# Patient Record
Sex: MALE | Race: WHITE
[De-identification: names, ages, dates, MRNs, and addresses within clinical notes are randomized per-mention and may not be internally consistent; named-entity substitution may affect disease eponyms.]

---

## 2022-12-07 ENCOUNTER — TELEMEDICINE (OUTPATIENT)
Dept: INTERNAL MEDICINE CLINIC | Facility: CLINIC | Age: 52
End: 2022-12-07
Payer: MEDICAID

## 2022-12-07 DIAGNOSIS — I10 HYPERTENSION, UNSPECIFIED TYPE: ICD-10-CM

## 2022-12-07 DIAGNOSIS — G89.29 OTHER CHRONIC PAIN: ICD-10-CM

## 2022-12-07 DIAGNOSIS — J44.9 CHRONIC OBSTRUCTIVE PULMONARY DISEASE, UNSPECIFIED COPD TYPE (HCC): ICD-10-CM

## 2022-12-07 DIAGNOSIS — Z76.89 ENCOUNTER TO ESTABLISH CARE: ICD-10-CM

## 2022-12-07 DIAGNOSIS — Z95.828 H/O AORTA-ILIAC-FEMORAL BYPASS: ICD-10-CM

## 2022-12-07 DIAGNOSIS — Z79.4 TYPE 2 DIABETES MELLITUS WITH HYPERGLYCEMIA, WITH LONG-TERM CURRENT USE OF INSULIN (HCC): Primary | ICD-10-CM

## 2022-12-07 DIAGNOSIS — E11.65 TYPE 2 DIABETES MELLITUS WITH HYPERGLYCEMIA, WITH LONG-TERM CURRENT USE OF INSULIN (HCC): Primary | ICD-10-CM

## 2022-12-07 DIAGNOSIS — M79.7 FIBROMYALGIA: ICD-10-CM

## 2022-12-07 DIAGNOSIS — G62.9 NEUROPATHY: ICD-10-CM

## 2022-12-07 DIAGNOSIS — Z12.11 SCREEN FOR COLON CANCER: ICD-10-CM

## 2022-12-07 PROCEDURE — 99204 OFFICE O/P NEW MOD 45 MIN: CPT | Performed by: INTERNAL MEDICINE

## 2022-12-07 RX ORDER — INSULIN ASPART 100 [IU]/ML
40 INJECTION, SUSPENSION SUBCUTANEOUS 2 TIMES DAILY
Qty: 10 ML | Refills: 2 | Status: SHIPPED | OUTPATIENT
Start: 2022-12-07 | End: 2022-12-08

## 2022-12-07 RX ORDER — INSULIN ASPART 100 [IU]/ML
40 INJECTION, SUSPENSION SUBCUTANEOUS 2 TIMES DAILY
COMMUNITY
End: 2022-12-07 | Stop reason: SDUPTHER

## 2022-12-07 RX ORDER — ASPIRIN 325 MG
325 TABLET ORAL DAILY
COMMUNITY

## 2022-12-07 RX ORDER — CLOPIDOGREL BISULFATE 75 MG/1
75 TABLET ORAL DAILY
Qty: 90 TABLET | Refills: 1 | Status: SHIPPED | OUTPATIENT
Start: 2022-12-07

## 2022-12-07 RX ORDER — CLOPIDOGREL BISULFATE 75 MG/1
75 TABLET ORAL DAILY
COMMUNITY
Start: 2019-12-03 | End: 2022-12-07 | Stop reason: SDUPTHER

## 2022-12-07 RX ORDER — METOPROLOL SUCCINATE 25 MG/1
TABLET, EXTENDED RELEASE ORAL
Qty: 90 TABLET | Refills: 1 | Status: SHIPPED | OUTPATIENT
Start: 2022-12-07

## 2022-12-07 RX ORDER — GABAPENTIN 300 MG/1
300 CAPSULE ORAL 3 TIMES DAILY
COMMUNITY
Start: 2019-12-22

## 2022-12-07 RX ORDER — METOPROLOL SUCCINATE 25 MG/1
TABLET, EXTENDED RELEASE ORAL
COMMUNITY
Start: 2022-02-15 | End: 2022-12-07 | Stop reason: SDUPTHER

## 2022-12-07 NOTE — TELEPHONE ENCOUNTER
Pt thought he was getting the pen for his insulin, rx was sent for vial.     Please call pharmacy to advise either way.

## 2022-12-07 NOTE — PROGRESS NOTES
FOLLOW UP VISIT    Subjective:    Yasmin Fontana (: 1970) is a 46 y.o., male,   Chief Complaint   Patient presents with    Establish Care       HPI:  46year old male in to establish care. He has the following medical issues. 1. DMII- on insulin - needs labs do not have old records not sure of last A1C has had BKA as complications states foot and eye exam up to date  2. High blood pressure- on BB  3. Neuropathy- on gabapentin  4. Chronic pain has pain mgmt  5. COPD?? Not on inhalers do not have old records  6. Hx of fem bypass ? On plavix  7. HCM   Due for CPE, COLO and Vaccines        The following portions of the patient's history were reviewed and updated as appropriate:      History reviewed. No pertinent past medical history. History reviewed. No pertinent surgical history. History reviewed. No pertinent family history. Social History     Socioeconomic History    Marital status: Single     Spouse name: Not on file    Number of children: Not on file    Years of education: Not on file    Highest education level: Not on file   Occupational History    Not on file   Tobacco Use    Smoking status: Not on file    Smokeless tobacco: Not on file   Substance and Sexual Activity    Alcohol use: Not on file    Drug use: Not on file    Sexual activity: Not on file   Other Topics Concern    Not on file   Social History Narrative    Not on file     Social Determinants of Health     Financial Resource Strain: Not on file   Food Insecurity: Not on file   Transportation Needs: Not on file   Physical Activity: Not on file   Stress: Not on file   Social Connections: Not on file   Intimate Partner Violence: Not on file   Housing Stability: Not on file       Current Outpatient Medications   Medication Sig Dispense Refill    gabapentin (NEURONTIN) 300 MG capsule Take 300 mg by mouth 3 times daily.       clopidogrel (PLAVIX) 75 MG tablet Take 1 tablet by mouth daily 90 tablet 1    insulin aspart protamine-insulin aspart (NOVOLOG 70/30) (70-30) 100 UNIT/ML injection Inject 40 Units into the skin 2 times daily 10 mL 2    metoprolol succinate (TOPROL XL) 25 MG extended release tablet TAKE 1 TABLET BY MOUTH EVERY DAY 90 tablet 1    aspirin 325 MG tablet Take 325 mg by mouth daily       No current facility-administered medications for this visit. Allergies as of 12/07/2022 - Fully Reviewed 12/07/2022   Allergen Reaction Noted    Morphine Anxiety and Other (See Comments) 01/22/2012    Clindamycin Other (See Comments) 08/31/2019       Review of Systems    Objective: There were no vitals taken for this visit. Physical Exam    No results found for this visit on 12/07/22. Assessent & Plan    1. Type 2 diabetes mellitus with hyperglycemia, with long-term current use of insulin (HCC)  -     Hemoglobin A1C; Future  -     Lipid Panel; Future  -     Comprehensive Metabolic Panel; Future  -     insulin aspart protamine-insulin aspart (NOVOLOG 70/30) (70-30) 100 UNIT/ML injection; Inject 40 Units into the skin 2 times daily, Disp-10 mL, R-2Normal  2. Other chronic pain  3. Chronic obstructive pulmonary disease, unspecified COPD type ([facility] [address]  4. Neuropathy  5. Fibromyalgia  6. Encounter to establish care  7. H/O aorta-iliac-femoral bypass  Comments:  on Plavix   Orders:  -     Mik Talbot MD, Vascular Surgery, [place]  -     clopidogrel (PLAVIX) 75 MG tablet; Take 1 tablet by mouth daily, Disp-90 tablet, R-1Normal  8. Screen for colon cancer  -     AFL - Hasmukh Grimaldo MD, Gastroenterology  9.  Hypertension, unspecified type  -     metoprolol succinate (TOPROL XL) 25 MG extended release tablet; TAKE 1 TABLET BY MOUTH EVERY DAY, Disp-90 tablet, R-1Normal     Reviewed records from [facility] need to obtain old labs and old records  Check labs  Do close follow up    Carlos Alberto Denson was evaluated through a synchronous (real-time) audio-video encounter, and/or her healthcare decision maker, is aware that it is a billable service, which includes applicable co-pays, with coverage as determined by her insurance carrier. She provided verbal consent to proceed and patient identification was verified. This visit was conducted pursuant to the emergency declaration under the [facility], [address] [place] [address] authority and the "[employer] and [employer] General Act. A caregiver was present when appropriate. Ability to conduct physical exam was limited. The patient was located at home in a state where the provider was licensed to provide care. The patient and/or patient representative voiced understanding and agreement with the current diagnoses, recommendations, and possible side effects. No follow-up provider specified.       Miguel Alvarez MD

## 2022-12-14 ENCOUNTER — TELEPHONE (OUTPATIENT)
Dept: INTERNAL MEDICINE CLINIC | Facility: CLINIC | Age: 52
End: 2022-12-14

## 2022-12-14 NOTE — TELEPHONE ENCOUNTER
A referral was sent to [facility] Cardiovascular [facility]. They called the patient and was informed by the patient that he already has a doctor for these needs through [facility] and has already had his legs amputated.  He does not wish to have these services trough [facility] and will be staying with [facility]

## 2022-12-19 ENCOUNTER — TELEMEDICINE (OUTPATIENT)
Dept: INTERNAL MEDICINE CLINIC | Facility: CLINIC | Age: 52
End: 2022-12-19
Payer: MEDICAID

## 2022-12-19 DIAGNOSIS — J20.9 ACUTE BRONCHITIS, UNSPECIFIED ORGANISM: Primary | ICD-10-CM

## 2022-12-19 PROCEDURE — 99214 OFFICE O/P EST MOD 30 MIN: CPT | Performed by: INTERNAL MEDICINE

## 2022-12-19 RX ORDER — PREDNISONE 20 MG/1
TABLET ORAL
Qty: 7 TABLET | Refills: 0 | Status: SHIPPED | OUTPATIENT
Start: 2022-12-19

## 2022-12-19 RX ORDER — AZITHROMYCIN 250 MG/1
250 TABLET, FILM COATED ORAL SEE ADMIN INSTRUCTIONS
Qty: 6 TABLET | Refills: 0 | Status: SHIPPED | OUTPATIENT
Start: 2022-12-19 | End: 2022-12-24

## 2022-12-19 RX ORDER — ALBUTEROL SULFATE 90 UG/1
2 AEROSOL, METERED RESPIRATORY (INHALATION) 4 TIMES DAILY PRN
Qty: 54 G | Refills: 1 | Status: SHIPPED | OUTPATIENT
Start: 2022-12-19

## 2022-12-19 NOTE — PROGRESS NOTES
FOLLOW UP VISIT    Subjective:    Eloise House (: 1970) is a 46 y.o., male,   Chief Complaint   Patient presents with    Follow-up       HPI:  46year old with a hx of Diabetes, COPD in for congestion, wheezing, sinus pressure and cough for 5 days. He states it is getting worse. He is using his inhaler abluterol more often        The following portions of the patient's history were reviewed and updated as appropriate:      History reviewed. No pertinent past medical history. History reviewed. No pertinent surgical history. History reviewed. No pertinent family history. Social History     Socioeconomic History    Marital status: Single     Spouse name: Not on file    Number of children: Not on file    Years of education: Not on file    Highest education level: Not on file   Occupational History    Not on file   Tobacco Use    Smoking status: Not on file    Smokeless tobacco: Not on file   Substance and Sexual Activity    Alcohol use: Not on file    Drug use: Not on file    Sexual activity: Not on file   Other Topics Concern    Not on file   Social History Narrative    Not on file     Social Determinants of Health     Financial Resource Strain: Not on file   Food Insecurity: Not on file   Transportation Needs: Not on file   Physical Activity: Not on file   Stress: Not on file   Social Connections: Not on file   Intimate Partner Violence: Not on file   Housing Stability: Not on file       Current Outpatient Medications   Medication Sig Dispense Refill    insulin aspart protamine-insulin aspart (NOVOLOG 70/30) (70-30) 100 UNIT/ML injection Inject 40 Units into the skin 2 times daily (with meals) 5 Adjustable Dose Pre-filled Pen Syringe 3    aspirin 325 MG tablet Take 325 mg by mouth daily      gabapentin (NEURONTIN) 300 MG capsule Take 300 mg by mouth 3 times daily.       clopidogrel (PLAVIX) 75 MG tablet Take 1 tablet by mouth daily 90 tablet 1    metoprolol succinate (TOPROL XL) 25 MG extended release tablet TAKE 1 TABLET BY MOUTH EVERY DAY 90 tablet 1     No current facility-administered medications for this visit. Allergies as of 12/19/2022 - Fully Reviewed 12/19/2022   Allergen Reaction Noted    Morphine Anxiety and Other (See Comments) 01/22/2012    Clindamycin Other (See Comments) 08/31/2019       Review of Systems    Objective: There were no vitals taken for this visit. Physical Exam    No results found for this visit on 12/19/22. Assessent & Plan     Diagnosis Orders   1. Acute bronchitis, unspecified organism  azithromycin (ZITHROMAX) 250 MG tablet    albuterol sulfate HFA (VENTOLIN HFA) 108 (90 Base) MCG/ACT inhaler    predniSONE (DELTASONE) 20 MG tablet         Rx meds  Go to ER if worse  Start meds   Get old records      Dianna Greene was evaluated through a synchronous (real-time) audio-video encounter, and/or her healthcare decision maker, is aware that it is a billable service, which includes applicable co-pays, with coverage as determined by her insurance carrier. She provided verbal consent to proceed and patient identification was verified. This visit was conducted pursuant to the emergency declaration under the [facility], [address] [place] [address] authority and the [employer] and [employer] General Act. A caregiver was present when appropriate. Ability to conduct physical exam was limited. The patient was located at home in a state where the provider was licensed to provide care. The patient and/or patient representative voiced understanding and agreement with the current diagnoses, recommendations, and possible side effects. No follow-up provider specified.       Frederic Foster MD

## 2023-02-06 ENCOUNTER — TELEMEDICINE (OUTPATIENT)
Dept: INTERNAL MEDICINE CLINIC | Facility: CLINIC | Age: 53
End: 2023-02-06

## 2023-02-06 DIAGNOSIS — J44.9 CHRONIC OBSTRUCTIVE PULMONARY DISEASE, UNSPECIFIED COPD TYPE (HCC): ICD-10-CM

## 2023-02-06 DIAGNOSIS — J20.9 ACUTE BRONCHITIS, UNSPECIFIED ORGANISM: ICD-10-CM

## 2023-02-06 DIAGNOSIS — E13.65 OTHER SPECIFIED DIABETES MELLITUS WITH HYPERGLYCEMIA, WITHOUT LONG-TERM CURRENT USE OF INSULIN (HCC): ICD-10-CM

## 2023-02-06 DIAGNOSIS — I10 HYPERTENSION, UNSPECIFIED TYPE: ICD-10-CM

## 2023-02-06 DIAGNOSIS — Z95.828 H/O AORTA-ILIAC-FEMORAL BYPASS: ICD-10-CM

## 2023-02-06 DIAGNOSIS — K46.9 HERNIA OF ABDOMINAL CAVITY: Primary | ICD-10-CM

## 2023-02-06 RX ORDER — CUSHION
EACH MISCELLANEOUS
COMMUNITY
Start: 2022-11-21

## 2023-02-06 RX ORDER — OXYCODONE AND ACETAMINOPHEN 10; 325 MG/1; MG/1
TABLET ORAL
COMMUNITY
Start: 2023-01-21

## 2023-02-06 RX ORDER — METOPROLOL SUCCINATE 25 MG/1
TABLET, EXTENDED RELEASE ORAL
Qty: 90 TABLET | Refills: 1 | Status: SHIPPED | OUTPATIENT
Start: 2023-02-06

## 2023-02-06 RX ORDER — CLOPIDOGREL BISULFATE 75 MG/1
75 TABLET ORAL DAILY
Qty: 90 TABLET | Refills: 1 | Status: SHIPPED | OUTPATIENT
Start: 2023-02-06

## 2023-02-06 RX ORDER — ALBUTEROL SULFATE 90 UG/1
2 AEROSOL, METERED RESPIRATORY (INHALATION) 4 TIMES DAILY PRN
Qty: 54 G | Refills: 1 | Status: SHIPPED | OUTPATIENT
Start: 2023-02-06

## 2023-02-06 ASSESSMENT — PATIENT HEALTH QUESTIONNAIRE - PHQ9
SUM OF ALL RESPONSES TO PHQ QUESTIONS 1-9: 0
SUM OF ALL RESPONSES TO PHQ QUESTIONS 1-9: 0
SUM OF ALL RESPONSES TO PHQ9 QUESTIONS 1 & 2: 0
1. LITTLE INTEREST OR PLEASURE IN DOING THINGS: 0
2. FEELING DOWN, DEPRESSED OR HOPELESS: 0
SUM OF ALL RESPONSES TO PHQ QUESTIONS 1-9: 0
SUM OF ALL RESPONSES TO PHQ QUESTIONS 1-9: 0

## 2023-02-06 NOTE — PROGRESS NOTES
FOLLOW UP VISIT    Subjective:    Dc Claudio (: 1970) is a 46 y.o., male,   Chief Complaint   Patient presents with    Follow-up     Needing supplies       HPI:  46year old male in to follow up. He has the following medical issues. 1. DMII- on insulin - needs labs do not have old records not sure of last A1C has had BKA as complications states foot and eye exam up to date. Advised to get his A1C done and labs  2. High blood pressure- on BB  3. Neuropathy- on gabapentin  4. Chronic pain has pain mgmt  5. COPD?? Not on inhalers do not have old records  6. Hx of fem bypass ? On plavix  7. HCM   Due for CPE, COLO and Vaccines  8. He is c/o a hernia on his abdomen. The following portions of the patient's history were reviewed and updated as appropriate:      History reviewed. No pertinent past medical history. History reviewed. No pertinent surgical history. History reviewed. No pertinent family history.     Social History     Socioeconomic History    Marital status: Single     Spouse name: Not on file    Number of children: Not on file    Years of education: Not on file    Highest education level: Not on file   Occupational History    Not on file   Tobacco Use    Smoking status: Not on file    Smokeless tobacco: Not on file   Substance and Sexual Activity    Alcohol use: Not on file    Drug use: Not on file    Sexual activity: Not on file   Other Topics Concern    Not on file   Social History Narrative    Not on file     Social Determinants of Health     Financial Resource Strain: Not on file   Food Insecurity: Not on file   Transportation Needs: Not on file   Physical Activity: Not on file   Stress: Not on file   Social Connections: Not on file   Intimate Partner Violence: Not on file   Housing Stability: Not on file       Current Outpatient Medications   Medication Sig Dispense Refill    oxyCODONE-acetaminophen (PERCOCET)  MG per tablet TAKE 1 TABLET BY MOUTH EVERY 6 HOURS AS NEEDED FOR 30 DAYS      Misc. Devices (Dorrine Mouna) MISC as directed      albuterol sulfate HFA (VENTOLIN HFA) 108 (90 Base) MCG/ACT inhaler Inhale 2 puffs into the lungs 4 times daily as needed for Wheezing 54 g 1    insulin aspart protamine-insulin aspart (NOVOLOG 70/30) (70-30) 100 UNIT/ML injection Inject 40 Units into the skin 2 times daily (with meals) 5 Adjustable Dose Pre-filled Pen Syringe 3    gabapentin (NEURONTIN) 300 MG capsule Take 300 mg by mouth 3 times daily. clopidogrel (PLAVIX) 75 MG tablet Take 1 tablet by mouth daily 90 tablet 1    metoprolol succinate (TOPROL XL) 25 MG extended release tablet TAKE 1 TABLET BY MOUTH EVERY DAY 90 tablet 1    aspirin 325 MG tablet Take 325 mg by mouth daily (Patient not taking: Reported on 2/6/2023)       No current facility-administered medications for this visit. Allergies as of 02/06/2023 - Fully Reviewed 02/06/2023   Allergen Reaction Noted    Morphine Anxiety and Other (See Comments) 01/22/2012    Clindamycin Other (See Comments) 08/31/2019       Review of Systems    Objective: There were no vitals taken for this visit. Physical Exam    No results found for this visit on 02/06/23. Assessent & Plan    1. Type 2 diabetes mellitus with hyperglycemia, with long-term current use of insulin (HCC)  -     Hemoglobin A1C; Future  -     Lipid Panel; Future  -     Comprehensive Metabolic Panel; Future  -     insulin aspart protamine-insulin aspart (NOVOLOG 70/30) (70-30) 100 UNIT/ML injection; Inject 40 Units into the skin 2 times daily, Disp-10 mL, R-2Normal  2. Other chronic pain  3. Chronic obstructive pulmonary disease, unspecified COPD type ([facility] [address]  4. Neuropathy  5. Fibromyalgia  6. Encounter to establish care  7. H/O aorta-iliac-femoral bypass  Comments:  on Plavix   Orders:  -     Jefferson Rowe MD, Vascular Surgery, [place]  -     clopidogrel (PLAVIX) 75 MG tablet;  Take 1 tablet by mouth daily, Disp-90 tablet, R-1Normal  8. Screen for colon cancer  -     AFL - Nito Michael MD, Gastroenterology  9. Hypertension, unspecified type  -     metoprolol succinate (TOPROL XL) 25 MG extended release   10. Hernia- patient states him and his wife feel it and it goes back in - will refer to [facility] was evaluated through a synchronous (real-time) audio-video encounter, and/or her healthcare decision maker, is aware that it is a billable service, which includes applicable co-pays, with coverage as determined by her insurance carrier. She provided verbal consent to proceed and patient identification was verified. This visit was conducted pursuant to the emergency declaration under the [facility] [address] [facility] authority and the [employer] and [employer] General Act. A caregiver was present when appropriate. Ability to conduct physical exam was limited. The patient was located at home in a state where the provider was licensed to provide care. The patient and/or patient representative voiced understanding and agreement with the current diagnoses, recommendations, and possible side effects. No follow-up provider specified.       Aryan Mandel MD

## 2023-02-21 ENCOUNTER — OFFICE VISIT (OUTPATIENT)
Dept: SURGERY | Age: 53
End: 2023-02-21
Payer: MEDICAID

## 2023-02-21 VITALS — DIASTOLIC BLOOD PRESSURE: 82 MMHG | HEART RATE: 112 BPM | SYSTOLIC BLOOD PRESSURE: 147 MMHG

## 2023-02-21 DIAGNOSIS — R10.33 PERIUMBILICAL PAIN: Primary | ICD-10-CM

## 2023-02-21 PROCEDURE — 99203 OFFICE O/P NEW LOW 30 MIN: CPT | Performed by: SURGERY

## 2023-02-22 NOTE — PROGRESS NOTES
Date: 2023      Name: Emani Tomas      MRN: [id number]       : 1970       Age: 46 y.o. Sex: male        Carly Garzon MD       CC:    Chief Complaint   Patient presents with    New Patient     NP - abdominal hernia       HPI:     Emani Tomas is a 46 y.o. male who presents for evaluation of a ventral hernia as a referral from Dr. Cookie Metcalf. I know the patient from a laparoscopic cholecystectomy done on 1/25/10. The patient is on Plavix. 23: The patient missed office visits on 22 and 22. He insisted he be seen today. He has some pain around his umbilicus when he sits up. He has difficulty sitting up as he has had bilateral AKA'S. He has some nausea, but no vomiting and no constipation. He has not noted a bulge or protrusion. He was told by his wife that the pain he was describing was most likely a hernia. There was no emergency for him to be seen today as this pain has been going on for over one year. PMH:    History reviewed. No pertinent past medical history. PSH:    Past Surgical History:   Procedure Laterality Date    AORTA - BILATERAL FEMORAL ARTERY BYPASS GRAFT      CHOLECYSTECTOMY      ELBOW SURGERY  2019    Left elbow replacement    LEG AMPUTATION THROUGH KNEE Bilateral 2019    TONSILLECTOMY         MEDS:    Prior to Visit Medications    Medication Sig Taking? Authorizing Provider   oxyCODONE-acetaminophen (PERCOCET)  MG per tablet every 6 hours as needed. Yes Historical Provider, MD   Misc.  Devices ([employer]) MISC as directed Yes Historical Provider, MD   albuterol sulfate HFA (VENTOLIN HFA) 108 (90 Base) MCG/ACT inhaler Inhale 2 puffs into the lungs 4 times daily as needed for Wheezing Yes Carly Garzon MD   metoprolol succinate (TOPROL XL) 25 MG extended release tablet TAKE 1 TABLET BY MOUTH EVERY DAY Yes Carly Garzon MD   insulin aspart protamine-insulin aspart (NOVOLOG 70/30) (70-30) 100 UNIT/ML injection Inject 40 Units into the skin 2 times daily (with meals)  Patient taking differently: Inject 35 Units into the skin 2 times daily (with meals) Yes Maximino Anthony MD   clopidogrel (PLAVIX) 75 MG tablet Take 1 tablet by mouth daily Yes Maximino Anthony MD   gabapentin (NEURONTIN) 300 MG capsule Take 900 mg by mouth in the morning and at bedtime. Yes Historical Provider, MD   aspirin 325 MG tablet Take 325 mg by mouth daily  Patient not taking: Reported on 2/21/2023  Historical Provider, MD       ALLERGIES:      Allergies   Allergen Reactions    Morphine Anxiety and Other (See Comments)     \"causes me to have panic attacks\"      Clindamycin Other (See Comments)     Swelling, rash       SH:    Social History     Tobacco Use    Smoking status: Every Day     Types: Cigarettes    Smokeless tobacco: Never       FH:    History reviewed. No pertinent family history. ROS: The patient has no difficulty with chest pain or shortness of breath. No fever or chills. Comprehensive 13 point review of systems was otherwise unremarkable except as noted above. Physical Exam:     BP (!) 147/82   Pulse (!) 112     General: Alert, oriented, cooperative white male in a wheelchair. Eyes: Sclera are clear. Conjunctiva and lids within normal limits. No icterus. Ears and Nose: no gross deformities to visual inspection, gross hearing intact  Neck: Supple, trachea midline, no appreciable thyromegaly  Resp: Breathing is  non-labored. Lungs clear to auscultation without wheezing or rhonchi   CV: RRR. No murmurs, rubs or gallops appreciated. Abd: soft,  obese, tenderness around the umbilicus, active BS'S. I could not appreciate a hernia on examination. Psych:  Mood and affect appropriate. Short-term memory and understanding intact      Assessment/Plan:  Umberto Macario is a 46 y.o. male who has periumbilical pain. 1.  CT scan of abdomen/pelvis with oral and IV contrast.    2.  F/U after CT scan done.     Naveed Fenton MD    FACS   2/21/2023  7:05 PM

## 2023-03-31 ENCOUNTER — TELEPHONE (OUTPATIENT)
Dept: INTERNAL MEDICINE CLINIC | Facility: CLINIC | Age: 53
End: 2023-03-31

## 2023-03-31 NOTE — TELEPHONE ENCOUNTER
Pt's mother asked if \"all patient supplies\" have been sent to \"[employer]\" medical supply store.  Writer informed parent, unable to find any communication r/t medical supplies and/or request. Parent reports she will \"get the information\" by the time Dr. Finch Call returns from vacation on 4/10/23

## 2023-11-20 ENCOUNTER — TELEPHONE (OUTPATIENT)
Dept: INTERNAL MEDICINE CLINIC | Facility: CLINIC | Age: 53
End: 2023-11-20

## 2023-11-20 NOTE — TELEPHONE ENCOUNTER
----- Message from Aurelio Cunninghamshahzad sent at 11/20/2023  4:12 PM EST -----  Subject: Message to Provider    QUESTIONS  Information for Provider? Timothy Carlton with [facility] calling to   get orders signed for home health. Patient will need tub bench, and drop   arm bedside commode. Please call Timothy Carlton ASA if provider is unable to   provide orders for the patient @ [phone]  ---------------------------------------------------------------------------  --------------  [address]  [phone]; OK to leave message on voicemail  ---------------------------------------------------------------------------  --------------  SCRIPT ANSWERS  Relationship to Patient? Covered Entity  Covered Entity Type? Home Health Care? Representative Name?  Timothy Carlton with [facility]

## 2023-12-07 DIAGNOSIS — E13.65 OTHER SPECIFIED DIABETES MELLITUS WITH HYPERGLYCEMIA, WITHOUT LONG-TERM CURRENT USE OF INSULIN (HCC): ICD-10-CM

## 2023-12-08 RX ORDER — INSULIN ASPART 100 [IU]/ML
INJECTION, SUSPENSION SUBCUTANEOUS
Refills: 3 | OUTPATIENT
Start: 2023-12-08

## 2023-12-13 ENCOUNTER — TELEPHONE (OUTPATIENT)
Dept: PRIMARY CARE CLINIC | Facility: CLINIC | Age: 53
End: 2023-12-13

## 2023-12-13 NOTE — TELEPHONE ENCOUNTER
POC (cert period 10/32/40-43/68/49) fax received  Pt last seen by PCP 02/06/23  POC accidentally signed and faxed to [facility]  VM left requesting call back

## 2023-12-13 NOTE — TELEPHONE ENCOUNTER
Spoke with Vincent Patel, clinical orders manager, who stated, at time of call, no signed POC received and that their system has been down most of the day. Vincent Patel reported she will note Pt's chart, if signed POC received, to not accept since PCP did not initiate and Pt has not been seen since 02/23.

## 2024-01-15 DIAGNOSIS — E13.65 OTHER SPECIFIED DIABETES MELLITUS WITH HYPERGLYCEMIA, WITHOUT LONG-TERM CURRENT USE OF INSULIN (HCC): ICD-10-CM

## 2024-01-15 DIAGNOSIS — J44.9 CHRONIC OBSTRUCTIVE PULMONARY DISEASE, UNSPECIFIED COPD TYPE (HCC): ICD-10-CM

## 2024-01-15 RX ORDER — ALBUTEROL SULFATE 90 UG/1
AEROSOL, METERED RESPIRATORY (INHALATION)
Qty: 36 EACH | OUTPATIENT
Start: 2024-01-15

## 2024-01-15 RX ORDER — INSULIN ASPART 100 [IU]/ML
INJECTION, SUSPENSION SUBCUTANEOUS
Refills: 3 | OUTPATIENT
Start: 2024-01-15

## 2024-01-16 DIAGNOSIS — E13.65 OTHER SPECIFIED DIABETES MELLITUS WITH HYPERGLYCEMIA, WITHOUT LONG-TERM CURRENT USE OF INSULIN (HCC): ICD-10-CM

## 2024-01-16 DIAGNOSIS — J44.9 CHRONIC OBSTRUCTIVE PULMONARY DISEASE, UNSPECIFIED COPD TYPE (HCC): ICD-10-CM

## 2024-01-16 RX ORDER — INSULIN ASPART 100 [IU]/ML
INJECTION, SUSPENSION SUBCUTANEOUS
Refills: 3 | OUTPATIENT
Start: 2024-01-16

## 2024-01-16 RX ORDER — ALBUTEROL SULFATE 90 UG/1
AEROSOL, METERED RESPIRATORY (INHALATION)
Qty: 36 EACH | OUTPATIENT
Start: 2024-01-16

## 2024-01-17 DIAGNOSIS — E13.65 OTHER SPECIFIED DIABETES MELLITUS WITH HYPERGLYCEMIA, WITHOUT LONG-TERM CURRENT USE OF INSULIN (HCC): ICD-10-CM

## 2024-01-17 RX ORDER — INSULIN ASPART 100 [IU]/ML
INJECTION, SUSPENSION SUBCUTANEOUS
Refills: 3 | OUTPATIENT
Start: 2024-01-17

## 2024-01-18 DIAGNOSIS — E13.65 OTHER SPECIFIED DIABETES MELLITUS WITH HYPERGLYCEMIA, WITHOUT LONG-TERM CURRENT USE OF INSULIN (HCC): ICD-10-CM

## 2024-01-19 DIAGNOSIS — E13.65 OTHER SPECIFIED DIABETES MELLITUS WITH HYPERGLYCEMIA, WITHOUT LONG-TERM CURRENT USE OF INSULIN (HCC): ICD-10-CM

## 2024-01-19 RX ORDER — INSULIN ASPART 100 [IU]/ML
INJECTION, SUSPENSION SUBCUTANEOUS
Refills: 3 | OUTPATIENT
Start: 2024-01-19

## 2024-01-23 DIAGNOSIS — E13.65 OTHER SPECIFIED DIABETES MELLITUS WITH HYPERGLYCEMIA, WITHOUT LONG-TERM CURRENT USE OF INSULIN (HCC): ICD-10-CM

## 2024-01-23 RX ORDER — INSULIN ASPART 100 [IU]/ML
INJECTION, SUSPENSION SUBCUTANEOUS
Refills: 3 | OUTPATIENT
Start: 2024-01-23

## 2024-01-24 RX ORDER — INSULIN ASPART 100 [IU]/ML
INJECTION, SUSPENSION SUBCUTANEOUS
Refills: 3 | OUTPATIENT
Start: 2024-01-24

## 2024-02-10 DIAGNOSIS — E13.65 OTHER SPECIFIED DIABETES MELLITUS WITH HYPERGLYCEMIA, WITHOUT LONG-TERM CURRENT USE OF INSULIN (HCC): ICD-10-CM

## 2024-02-12 RX ORDER — INSULIN ASPART 100 [IU]/ML
INJECTION, SUSPENSION SUBCUTANEOUS
Refills: 3 | OUTPATIENT
Start: 2024-02-12

## 2024-02-15 ENCOUNTER — APPOINTMENT (OUTPATIENT)
Dept: GENERAL RADIOLOGY | Age: 54
End: 2024-02-15
Payer: MEDICAID

## 2024-02-15 ENCOUNTER — HOSPITAL ENCOUNTER (INPATIENT)
Age: 54
LOS: 2 days | Discharge: HOME | End: 2024-02-17
Attending: FAMILY MEDICINE | Admitting: INTERNAL MEDICINE
Payer: MEDICAID

## 2024-02-15 ENCOUNTER — HOSPITAL ENCOUNTER (EMERGENCY)
Age: 54
Discharge: ANOTHER HOSPITAL | End: 2024-02-15
Attending: EMERGENCY MEDICINE
Payer: MEDICAID

## 2024-02-15 VITALS
TEMPERATURE: 98.5 F | DIASTOLIC BLOOD PRESSURE: 77 MMHG | WEIGHT: 168 LBS | HEART RATE: 107 BPM | OXYGEN SATURATION: 97 % | SYSTOLIC BLOOD PRESSURE: 124 MMHG | RESPIRATION RATE: 17 BRPM

## 2024-02-15 DIAGNOSIS — Z89.612 S/P AKA (ABOVE KNEE AMPUTATION) BILATERAL (HCC): Primary | ICD-10-CM

## 2024-02-15 DIAGNOSIS — Z76.89 ENCOUNTER TO ESTABLISH CARE: ICD-10-CM

## 2024-02-15 DIAGNOSIS — Z95.828 H/O AORTA-ILIAC-FEMORAL BYPASS: ICD-10-CM

## 2024-02-15 DIAGNOSIS — J10.1 INFLUENZA A: ICD-10-CM

## 2024-02-15 DIAGNOSIS — Z89.611 S/P AKA (ABOVE KNEE AMPUTATION) BILATERAL (HCC): Primary | ICD-10-CM

## 2024-02-15 DIAGNOSIS — E13.65 OTHER SPECIFIED DIABETES MELLITUS WITH HYPERGLYCEMIA, WITHOUT LONG-TERM CURRENT USE OF INSULIN (HCC): ICD-10-CM

## 2024-02-15 DIAGNOSIS — G62.9 NEUROPATHY: ICD-10-CM

## 2024-02-15 DIAGNOSIS — I10 HYPERTENSION, UNSPECIFIED TYPE: ICD-10-CM

## 2024-02-15 DIAGNOSIS — E10.10 TYPE 1 DIABETES MELLITUS WITH KETOACIDOSIS WITHOUT COMA (HCC): Primary | ICD-10-CM

## 2024-02-15 DIAGNOSIS — E87.1 HYPONATREMIA: ICD-10-CM

## 2024-02-15 DIAGNOSIS — J44.9 CHRONIC OBSTRUCTIVE PULMONARY DISEASE, UNSPECIFIED COPD TYPE (HCC): ICD-10-CM

## 2024-02-15 PROBLEM — A41.9 SEPSIS (HCC): Status: ACTIVE | Noted: 2024-02-15

## 2024-02-15 PROBLEM — E11.10 DKA, TYPE 2, NOT AT GOAL (HCC): Status: ACTIVE | Noted: 2024-02-15

## 2024-02-15 LAB
ALBUMIN SERPL-MCNC: 3.3 G/DL (ref 3.5–5)
ALBUMIN/GLOB SERPL: 0.8 (ref 0.4–1.6)
ALP SERPL-CCNC: 163 U/L (ref 45–117)
ALT SERPL-CCNC: 15 U/L (ref 13–61)
ANION GAP SERPL CALC-SCNC: 17 MMOL/L (ref 2–11)
ANION GAP SERPL CALC-SCNC: 24 MMOL/L (ref 2–11)
ANION GAP SERPL CALC-SCNC: 9 MMOL/L (ref 2–11)
APPEARANCE UR: CLEAR
AST SERPL-CCNC: 11 U/L (ref 15–37)
BACTERIA URNS QL MICRO: 0 /HPF
BASE DEFICIT BLDV-SCNC: 3 MMOL/L
BASOPHILS # BLD: 0.1 K/UL (ref 0–0.2)
BASOPHILS NFR BLD: 0 % (ref 0–2)
BILIRUB SERPL-MCNC: 0.5 MG/DL (ref 0.2–1.1)
BILIRUB UR QL: NEGATIVE
BUN SERPL-MCNC: 15 MG/DL (ref 6–23)
BUN SERPL-MCNC: 15 MG/DL (ref 6–23)
BUN SERPL-MCNC: 16 MG/DL (ref 6–23)
CALCIUM SERPL-MCNC: 8.1 MG/DL (ref 8.3–10.4)
CALCIUM SERPL-MCNC: 8.5 MG/DL (ref 8.3–10.4)
CALCIUM SERPL-MCNC: 8.9 MG/DL (ref 8.3–10.4)
CASTS URNS QL MICRO: 0 /LPF
CHLORIDE SERPL-SCNC: 83 MMOL/L (ref 98–107)
CHLORIDE SERPL-SCNC: 94 MMOL/L (ref 103–113)
CHLORIDE SERPL-SCNC: 97 MMOL/L (ref 103–113)
CO2 SERPL-SCNC: 19 MMOL/L (ref 21–32)
CO2 SERPL-SCNC: 21 MMOL/L (ref 21–32)
CO2 SERPL-SCNC: 26 MMOL/L (ref 21–32)
COLOR UR: ABNORMAL
CREAT SERPL-MCNC: 0.8 MG/DL (ref 0.8–1.5)
CREAT SERPL-MCNC: 0.83 MG/DL (ref 0.8–1.5)
CREAT SERPL-MCNC: 0.9 MG/DL (ref 0.8–1.5)
CRYSTALS URNS QL MICRO: 0 /LPF
DIFFERENTIAL METHOD BLD: ABNORMAL
EOSINOPHIL # BLD: 0 K/UL (ref 0–0.8)
EOSINOPHIL NFR BLD: 0 % (ref 0.5–7.8)
EPI CELLS #/AREA URNS HPF: NORMAL /HPF
ERYTHROCYTE [DISTWIDTH] IN BLOOD BY AUTOMATED COUNT: 13.7 % (ref 11.9–14.6)
EST. AVERAGE GLUCOSE BLD GHB EST-MCNC: 326 MG/DL
GLOBULIN SER CALC-MCNC: 4 G/DL (ref 2.8–4.5)
GLUCOSE BLD STRIP.AUTO-MCNC: 200 MG/DL (ref 65–100)
GLUCOSE BLD STRIP.AUTO-MCNC: 234 MG/DL (ref 65–100)
GLUCOSE BLD STRIP.AUTO-MCNC: 265 MG/DL (ref 65–100)
GLUCOSE BLD STRIP.AUTO-MCNC: 304 MG/DL (ref 65–100)
GLUCOSE BLD STRIP.AUTO-MCNC: 380 MG/DL (ref 65–100)
GLUCOSE BLD STRIP.AUTO-MCNC: 399 MG/DL (ref 65–100)
GLUCOSE BLD STRIP.AUTO-MCNC: 408 MG/DL (ref 65–100)
GLUCOSE BLD STRIP.AUTO-MCNC: 453 MG/DL (ref 65–100)
GLUCOSE BLD STRIP.AUTO-MCNC: 467 MG/DL (ref 65–100)
GLUCOSE BLD STRIP.AUTO-MCNC: 510 MG/DL (ref 65–100)
GLUCOSE SERPL-MCNC: 262 MG/DL (ref 65–100)
GLUCOSE SERPL-MCNC: 409 MG/DL (ref 65–100)
GLUCOSE SERPL-MCNC: 558 MG/DL (ref 65–100)
GLUCOSE UR STRIP.AUTO-MCNC: 500 MG/DL
HBA1C MFR BLD: 13 % (ref 4.8–5.6)
HCO3 BLDV-SCNC: 23.4 MMOL/L (ref 23–28)
HCT VFR BLD AUTO: 46.3 % (ref 41.1–50.3)
HGB BLD-MCNC: 15.9 G/DL (ref 13.6–17.2)
HGB UR QL STRIP: ABNORMAL
IMM GRANULOCYTES # BLD AUTO: 0.1 K/UL (ref 0–0.5)
IMM GRANULOCYTES NFR BLD AUTO: 1 % (ref 0–5)
KETONES UR QL STRIP.AUTO: >160 MG/DL
LACTATE SERPL-SCNC: 2 MMOL/L (ref 0.4–2)
LEUKOCYTE ESTERASE UR QL STRIP.AUTO: NEGATIVE
LYMPHOCYTES # BLD: 2.1 K/UL (ref 0.5–4.6)
LYMPHOCYTES NFR BLD: 13 % (ref 13–44)
MAGNESIUM SERPL-MCNC: 2.3 MG/DL (ref 1.8–2.4)
MCH RBC QN AUTO: 29.3 PG (ref 26.1–32.9)
MCHC RBC AUTO-ENTMCNC: 34.3 G/DL (ref 31.4–35)
MCV RBC AUTO: 85.4 FL (ref 82–102)
MONOCYTES # BLD: 0.9 K/UL (ref 0.1–1.3)
MONOCYTES NFR BLD: 6 % (ref 4–12)
MUCOUS THREADS URNS QL MICRO: 0 /LPF
NEUTS SEG # BLD: 12.5 K/UL (ref 1.7–8.2)
NEUTS SEG NFR BLD: 80 % (ref 43–78)
NITRITE UR QL STRIP.AUTO: NEGATIVE
NRBC # BLD: 0 K/UL (ref 0–0.2)
OTHER OBSERVATIONS: NORMAL
PH BLDV: 7.33 (ref 7.32–7.42)
PH UR STRIP: 5.5 (ref 5–9)
PHOSPHATE SERPL-MCNC: 2.7 MG/DL (ref 2.5–4.5)
PLATELET # BLD AUTO: 289 K/UL (ref 150–450)
PMV BLD AUTO: 9.5 FL (ref 9.4–12.3)
PO2 BLDV: 25 MMHG
POTASSIUM SERPL-SCNC: 3.5 MMOL/L (ref 3.5–5.1)
POTASSIUM SERPL-SCNC: 4.2 MMOL/L (ref 3.5–5.1)
POTASSIUM SERPL-SCNC: 4.6 MMOL/L (ref 3.5–5.1)
PROCALCITONIN SERPL-MCNC: 0.23 NG/ML (ref 0–0.49)
PROT SERPL-MCNC: 7.3 G/DL (ref 6.4–8.2)
PROT UR STRIP-MCNC: 100 MG/DL
RBC # BLD AUTO: 5.42 M/UL (ref 4.23–5.6)
RBC #/AREA URNS HPF: NORMAL /HPF
SAO2 % BLDV: 41 % (ref 65–88)
SERVICE CMNT-IMP: ABNORMAL
SODIUM SERPL-SCNC: 128 MMOL/L (ref 133–143)
SODIUM SERPL-SCNC: 130 MMOL/L (ref 136–146)
SODIUM SERPL-SCNC: 132 MMOL/L (ref 136–146)
SP GR UR REFRACTOMETRY: 1.01 (ref 1–1.02)
SPECIMEN TYPE: ABNORMAL
UROBILINOGEN UR QL STRIP.AUTO: 0.2 EU/DL (ref 0.2–1)
WBC # BLD AUTO: 15.7 K/UL (ref 4.3–11.1)
WBC URNS QL MICRO: 0 /HPF

## 2024-02-15 PROCEDURE — 82962 GLUCOSE BLOOD TEST: CPT

## 2024-02-15 PROCEDURE — 71045 X-RAY EXAM CHEST 1 VIEW: CPT

## 2024-02-15 PROCEDURE — 80053 COMPREHEN METABOLIC PANEL: CPT

## 2024-02-15 PROCEDURE — 81001 URINALYSIS AUTO W/SCOPE: CPT

## 2024-02-15 PROCEDURE — 84100 ASSAY OF PHOSPHORUS: CPT

## 2024-02-15 PROCEDURE — 85025 COMPLETE CBC W/AUTO DIFF WBC: CPT

## 2024-02-15 PROCEDURE — 2580000003 HC RX 258: Performed by: INTERNAL MEDICINE

## 2024-02-15 PROCEDURE — 96375 TX/PRO/DX INJ NEW DRUG ADDON: CPT

## 2024-02-15 PROCEDURE — 83036 HEMOGLOBIN GLYCOSYLATED A1C: CPT

## 2024-02-15 PROCEDURE — 99285 EMERGENCY DEPT VISIT HI MDM: CPT

## 2024-02-15 PROCEDURE — 2000000000 HC ICU R&B

## 2024-02-15 PROCEDURE — 6360000002 HC RX W HCPCS: Performed by: INTERNAL MEDICINE

## 2024-02-15 PROCEDURE — 2580000003 HC RX 258: Performed by: PHYSICIAN ASSISTANT

## 2024-02-15 PROCEDURE — 87040 BLOOD CULTURE FOR BACTERIA: CPT

## 2024-02-15 PROCEDURE — 6370000000 HC RX 637 (ALT 250 FOR IP): Performed by: INTERNAL MEDICINE

## 2024-02-15 PROCEDURE — 83605 ASSAY OF LACTIC ACID: CPT

## 2024-02-15 PROCEDURE — 6360000002 HC RX W HCPCS: Performed by: PHYSICIAN ASSISTANT

## 2024-02-15 PROCEDURE — 80048 BASIC METABOLIC PNL TOTAL CA: CPT

## 2024-02-15 PROCEDURE — 2500000003 HC RX 250 WO HCPCS: Performed by: INTERNAL MEDICINE

## 2024-02-15 PROCEDURE — 6370000000 HC RX 637 (ALT 250 FOR IP): Performed by: PHYSICIAN ASSISTANT

## 2024-02-15 PROCEDURE — 84145 PROCALCITONIN (PCT): CPT

## 2024-02-15 PROCEDURE — 96361 HYDRATE IV INFUSION ADD-ON: CPT

## 2024-02-15 PROCEDURE — 83735 ASSAY OF MAGNESIUM: CPT

## 2024-02-15 PROCEDURE — 96374 THER/PROPH/DIAG INJ IV PUSH: CPT

## 2024-02-15 PROCEDURE — 36415 COLL VENOUS BLD VENIPUNCTURE: CPT

## 2024-02-15 PROCEDURE — 82803 BLOOD GASES ANY COMBINATION: CPT

## 2024-02-15 RX ORDER — ALBUTEROL SULFATE 2.5 MG/3ML
2.5 SOLUTION RESPIRATORY (INHALATION) EVERY 6 HOURS PRN
Status: DISCONTINUED | OUTPATIENT
Start: 2024-02-15 | End: 2024-02-17 | Stop reason: HOSPADM

## 2024-02-15 RX ORDER — OSELTAMIVIR PHOSPHATE 75 MG/1
75 CAPSULE ORAL 2 TIMES DAILY
Status: DISCONTINUED | OUTPATIENT
Start: 2024-02-15 | End: 2024-02-17 | Stop reason: HOSPADM

## 2024-02-15 RX ORDER — POTASSIUM CHLORIDE 7.45 MG/ML
10 INJECTION INTRAVENOUS PRN
Status: DISCONTINUED | OUTPATIENT
Start: 2024-02-15 | End: 2024-02-17 | Stop reason: HOSPADM

## 2024-02-15 RX ORDER — DEXTROSE MONOHYDRATE, SODIUM CHLORIDE, AND POTASSIUM CHLORIDE 50; 1.49; 4.5 G/1000ML; G/1000ML; G/1000ML
INJECTION, SOLUTION INTRAVENOUS CONTINUOUS PRN
Status: DISCONTINUED | OUTPATIENT
Start: 2024-02-15 | End: 2024-02-16

## 2024-02-15 RX ORDER — ENOXAPARIN SODIUM 100 MG/ML
40 INJECTION SUBCUTANEOUS EVERY 24 HOURS
Status: DISCONTINUED | OUTPATIENT
Start: 2024-02-15 | End: 2024-02-17 | Stop reason: HOSPADM

## 2024-02-15 RX ORDER — MAGNESIUM SULFATE 1 G/100ML
1000 INJECTION INTRAVENOUS PRN
Status: DISCONTINUED | OUTPATIENT
Start: 2024-02-15 | End: 2024-02-17 | Stop reason: HOSPADM

## 2024-02-15 RX ORDER — 0.9 % SODIUM CHLORIDE 0.9 %
1000 INTRAVENOUS SOLUTION INTRAVENOUS
Status: COMPLETED | OUTPATIENT
Start: 2024-02-15 | End: 2024-02-15

## 2024-02-15 RX ORDER — GABAPENTIN 300 MG/1
900 CAPSULE ORAL 2 TIMES DAILY
Status: DISCONTINUED | OUTPATIENT
Start: 2024-02-15 | End: 2024-02-17 | Stop reason: HOSPADM

## 2024-02-15 RX ORDER — ONDANSETRON 2 MG/ML
4 INJECTION INTRAMUSCULAR; INTRAVENOUS
Status: COMPLETED | OUTPATIENT
Start: 2024-02-15 | End: 2024-02-15

## 2024-02-15 RX ORDER — METOPROLOL SUCCINATE 25 MG/1
25 TABLET, EXTENDED RELEASE ORAL DAILY
Status: DISCONTINUED | OUTPATIENT
Start: 2024-02-15 | End: 2024-02-17 | Stop reason: HOSPADM

## 2024-02-15 RX ORDER — POLYETHYLENE GLYCOL 3350 17 G/17G
17 POWDER, FOR SOLUTION ORAL DAILY PRN
Status: DISCONTINUED | OUTPATIENT
Start: 2024-02-15 | End: 2024-02-17 | Stop reason: HOSPADM

## 2024-02-15 RX ORDER — ONDANSETRON 2 MG/ML
4 INJECTION INTRAMUSCULAR; INTRAVENOUS EVERY 6 HOURS PRN
Status: DISCONTINUED | OUTPATIENT
Start: 2024-02-15 | End: 2024-02-17 | Stop reason: HOSPADM

## 2024-02-15 RX ORDER — INSULIN ASPART 100 [IU]/ML
INJECTION, SUSPENSION SUBCUTANEOUS
Refills: 3 | OUTPATIENT
Start: 2024-02-15

## 2024-02-15 RX ORDER — OXYCODONE AND ACETAMINOPHEN 10; 325 MG/1; MG/1
1 TABLET ORAL EVERY 6 HOURS PRN
Status: DISCONTINUED | OUTPATIENT
Start: 2024-02-15 | End: 2024-02-17 | Stop reason: HOSPADM

## 2024-02-15 RX ORDER — BISACODYL 10 MG
10 SUPPOSITORY, RECTAL RECTAL DAILY PRN
Status: DISCONTINUED | OUTPATIENT
Start: 2024-02-15 | End: 2024-02-17 | Stop reason: HOSPADM

## 2024-02-15 RX ORDER — SODIUM CHLORIDE 450 MG/100ML
INJECTION, SOLUTION INTRAVENOUS CONTINUOUS
Status: DISCONTINUED | OUTPATIENT
Start: 2024-02-15 | End: 2024-02-15

## 2024-02-15 RX ADMIN — SODIUM CHLORIDE 6.4 UNITS/HR: 9 INJECTION, SOLUTION INTRAVENOUS at 17:25

## 2024-02-15 RX ADMIN — OXYCODONE AND ACETAMINOPHEN 1 TABLET: 10; 325 TABLET ORAL at 18:09

## 2024-02-15 RX ADMIN — INSULIN HUMAN 10 UNITS: 100 INJECTION, SOLUTION PARENTERAL at 14:00

## 2024-02-15 RX ADMIN — SODIUM CHLORIDE 1000 ML: 9 INJECTION, SOLUTION INTRAVENOUS at 12:52

## 2024-02-15 RX ADMIN — GABAPENTIN 900 MG: 300 CAPSULE ORAL at 22:39

## 2024-02-15 RX ADMIN — OSELTAMIVIR PHOSPHATE 75 MG: 75 CAPSULE ORAL at 22:39

## 2024-02-15 RX ADMIN — METOPROLOL SUCCINATE 25 MG: 25 TABLET, EXTENDED RELEASE ORAL at 19:20

## 2024-02-15 RX ADMIN — DEXTROSE MONOHYDRATE, SODIUM CHLORIDE, AND POTASSIUM CHLORIDE: 50; 4.5; 1.49 INJECTION, SOLUTION INTRAVENOUS at 22:34

## 2024-02-15 RX ADMIN — ENOXAPARIN SODIUM 40 MG: 100 INJECTION SUBCUTANEOUS at 17:20

## 2024-02-15 RX ADMIN — ONDANSETRON 4 MG: 2 INJECTION INTRAMUSCULAR; INTRAVENOUS at 18:08

## 2024-02-15 RX ADMIN — ONDANSETRON 4 MG: 2 INJECTION INTRAMUSCULAR; INTRAVENOUS at 12:51

## 2024-02-15 RX ADMIN — SODIUM CHLORIDE: 4.5 INJECTION, SOLUTION INTRAVENOUS at 17:19

## 2024-02-15 ASSESSMENT — PAIN DESCRIPTION - LOCATION
LOCATION: GENERALIZED

## 2024-02-15 ASSESSMENT — PAIN DESCRIPTION - ORIENTATION: ORIENTATION: ANTERIOR

## 2024-02-15 ASSESSMENT — PAIN SCALES - GENERAL
PAINLEVEL_OUTOF10: 4
PAINLEVEL_OUTOF10: 0
PAINLEVEL_OUTOF10: 10
PAINLEVEL_OUTOF10: 10

## 2024-02-15 ASSESSMENT — PAIN - FUNCTIONAL ASSESSMENT: PAIN_FUNCTIONAL_ASSESSMENT: PREVENTS OR INTERFERES SOME ACTIVE ACTIVITIES AND ADLS

## 2024-02-15 ASSESSMENT — PAIN DESCRIPTION - DESCRIPTORS: DESCRIPTORS: SORE

## 2024-02-15 NOTE — ED NOTES
TRANSFER - OUT REPORT:    Verbal report given to Rahel CORRALES on Nikita Styles  being transferred to [facility] for routine progression of patient care       Report consisted of patient's Situation, Background, Assessment and   Recommendations(SBAR).     Information from the following report(s) Nurse Handoff Report, Index, ED Encounter Summary, ED SBAR, Adult Overview, MAR, and Recent Results was reviewed with the receiving nurse.    Lines:   Peripheral IV 02/15/24 Left Antecubital (Active)       Peripheral IV 02/15/24 Right Antecubital (Active)   Line Status Blood return noted;Flushed;Normal saline locked;Specimen collected 02/15/24 1343   Phlebitis Assessment No symptoms 02/15/24 1343   Infiltration Assessment 0 02/15/24 1343   Dressing Status New dressing applied;Clean, dry & intact 02/15/24 1343   Dressing Type Transparent 02/15/24 1343   Dressing Intervention New 02/15/24 1343        Opportunity for questions and clarification was provided.      Patient transported with: [employer]  Monitor

## 2024-02-15 NOTE — PROGRESS NOTES
Pt arrived to [facility]109 from [place] ED. Pt drowsy, oriented x4.    -110 on monitor, O2 sat 95% on RA.    Dual skin assessment performed with SAVANNA Mcginnis. BLE amputations, scarring noted. Sacrum red, foam dressing applied.

## 2024-02-15 NOTE — ED PROVIDER NOTES
Emergency Department Provider Note       PCP: No primary care provider on file.   Age: 53 y.o.   Sex: male     DISPOSITION Decision To Transfer 02/15/2024 01:31:58 PM       ICD-10-CM    1. Type 1 diabetes mellitus with ketoacidosis without coma (HCC)  E10.10       2. Hyponatremia  E87.1       3. Influenza A  J10.1           Medical Decision Making     Complexity of Problems Addressed:  1 or more acute illnesses that pose a threat to life or bodily function.     Data Reviewed and Analyzed:   I independently ordered and reviewed each unique test.           I interpreted the X-rays no pneumothorax.    Discussion of management or test interpretation.  53-year-old male presenting here with complaint of generalized bodyaches and positive for flu A.  He is type I diabetic and has not use his blood sugar secondary to not feeling well.  Workup here reveals patient is in diabetic ketoacidosis.  He has a glucose of 558 with an anion gap of 24 and he is hyponatremic at 128.  He had normal potassium so we bolused 10 units of insulin as well as 1 L of normal saline.  Given the patient is in DKA he will require ICU admission.  Consulted hospitalist team at [facility].  Dr. Soto is excepting physician.  We obtained a VBG here, patient's pH is currently 7.3.        The patient was admitted and I have discussed patient management with the admitting provider.  The management of this patient was discussed with an external consultant.      Risk of Complications and/or Morbidity of Patient Management:  Over the counter drug management performed.  Parental controlled substances given in the ED.  Drug therapy given requiring intensive monitoring for toxicity.  Discussion with external consultants.  Shared medical decision making was utilized in creating the patients health plan today.        Critical care procedure note : 40 minutes of critical care time was performed in the emergency department. This was separate from any other

## 2024-02-15 NOTE — ED PROVIDER NOTES
ED ATTENDING SHARED SERVICES NOTE:     I have seen and evaluated the patient and performed an independent history and physical exam, and I agree with the assessment and plan as documented in the advanced provider note.  I performed the history of present illness and medical decision making in its entirety.  With the following updates: 53-year-old male with lower extremity amputations.  Recently diagnosed with flu  Workup here today has revealed DKA with relatively mild acidosis on VBG   Chest x-ray unremarkable  Urine negative    Will transfer for admission to the hospital service    2/15/24 3:24 PM EST MD Cherie Watts Kurt A, MD  02/15/24 [id number]
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
## 2024-02-15 NOTE — PROGRESS NOTES
TRANSFER - IN REPORT:    Verbal report received from Alyssa RN (name) on Nikita Styles  being received from [facility] ED (unit) for routine progression of patient care      Report consisted of patient’s Situation, Background, Assessment and   Recommendations(SBAR).     Information from the following report(s) Nurse Handoff Report, ED Encounter Summary, and ED SBAR was reviewed with the receiving nurse.    Opportunity for questions and clarification was provided.      Assessment completed upon patient’s arrival to unit and care assume

## 2024-02-15 NOTE — ED TRIAGE NOTES
Pt here w/ lcems from home, pt has had cold and flu s/s for several days, also some n/v, bodyaches, 97.8.  154/94.  Hr 110.  Bgl 154.  Lungs clear w/ ems.  Diabetic. htn.  Copd.  Pt tested + for flu yesterday.

## 2024-02-16 DIAGNOSIS — E13.65 OTHER SPECIFIED DIABETES MELLITUS WITH HYPERGLYCEMIA, WITHOUT LONG-TERM CURRENT USE OF INSULIN (HCC): ICD-10-CM

## 2024-02-16 LAB
ANION GAP SERPL CALC-SCNC: 5 MMOL/L (ref 2–11)
ANION GAP SERPL CALC-SCNC: 5 MMOL/L (ref 2–11)
BASOPHILS # BLD: 0 K/UL (ref 0–0.2)
BASOPHILS NFR BLD: 0 % (ref 0–2)
BUN SERPL-MCNC: 14 MG/DL (ref 6–23)
BUN SERPL-MCNC: 15 MG/DL (ref 6–23)
CALCIUM SERPL-MCNC: 8.2 MG/DL (ref 8.3–10.4)
CALCIUM SERPL-MCNC: 8.5 MG/DL (ref 8.3–10.4)
CHLORIDE SERPL-SCNC: 101 MMOL/L (ref 103–113)
CHLORIDE SERPL-SCNC: 98 MMOL/L (ref 103–113)
CO2 SERPL-SCNC: 27 MMOL/L (ref 21–32)
CO2 SERPL-SCNC: 30 MMOL/L (ref 21–32)
CREAT SERPL-MCNC: 0.7 MG/DL (ref 0.8–1.5)
CREAT SERPL-MCNC: 0.8 MG/DL (ref 0.8–1.5)
DIFFERENTIAL METHOD BLD: ABNORMAL
EOSINOPHIL # BLD: 0.2 K/UL (ref 0–0.8)
EOSINOPHIL NFR BLD: 1 % (ref 0.5–7.8)
ERYTHROCYTE [DISTWIDTH] IN BLOOD BY AUTOMATED COUNT: 14.1 % (ref 11.9–14.6)
GLUCOSE BLD STRIP.AUTO-MCNC: 123 MG/DL (ref 65–100)
GLUCOSE BLD STRIP.AUTO-MCNC: 137 MG/DL (ref 65–100)
GLUCOSE BLD STRIP.AUTO-MCNC: 174 MG/DL (ref 65–100)
GLUCOSE BLD STRIP.AUTO-MCNC: 182 MG/DL (ref 65–100)
GLUCOSE BLD STRIP.AUTO-MCNC: 238 MG/DL (ref 65–100)
GLUCOSE BLD STRIP.AUTO-MCNC: 246 MG/DL (ref 65–100)
GLUCOSE BLD STRIP.AUTO-MCNC: 268 MG/DL (ref 65–100)
GLUCOSE BLD STRIP.AUTO-MCNC: 282 MG/DL (ref 65–100)
GLUCOSE SERPL-MCNC: 139 MG/DL (ref 65–100)
GLUCOSE SERPL-MCNC: 163 MG/DL (ref 65–100)
HCT VFR BLD AUTO: 40.6 % (ref 41.1–50.3)
HGB BLD-MCNC: 13.6 G/DL (ref 13.6–17.2)
IMM GRANULOCYTES # BLD AUTO: 0.1 K/UL (ref 0–0.5)
IMM GRANULOCYTES NFR BLD AUTO: 1 % (ref 0–5)
LYMPHOCYTES # BLD: 3.2 K/UL (ref 0.5–4.6)
LYMPHOCYTES NFR BLD: 24 % (ref 13–44)
MAGNESIUM SERPL-MCNC: 2.2 MG/DL (ref 1.8–2.4)
MCH RBC QN AUTO: 28.9 PG (ref 26.1–32.9)
MCHC RBC AUTO-ENTMCNC: 33.5 G/DL (ref 31.4–35)
MCV RBC AUTO: 86.4 FL (ref 82–102)
MONOCYTES # BLD: 1.1 K/UL (ref 0.1–1.3)
MONOCYTES NFR BLD: 9 % (ref 4–12)
NEUTS SEG # BLD: 8.6 K/UL (ref 1.7–8.2)
NEUTS SEG NFR BLD: 65 % (ref 43–78)
NRBC # BLD: 0 K/UL (ref 0–0.2)
PHOSPHATE SERPL-MCNC: 1.6 MG/DL (ref 2.5–4.5)
PLATELET # BLD AUTO: 265 K/UL (ref 150–450)
PMV BLD AUTO: 9.4 FL (ref 9.4–12.3)
POTASSIUM SERPL-SCNC: 3.4 MMOL/L (ref 3.5–5.1)
POTASSIUM SERPL-SCNC: 3.9 MMOL/L (ref 3.5–5.1)
RBC # BLD AUTO: 4.7 M/UL (ref 4.23–5.6)
SERVICE CMNT-IMP: ABNORMAL
SODIUM SERPL-SCNC: 133 MMOL/L (ref 136–146)
SODIUM SERPL-SCNC: 133 MMOL/L (ref 136–146)
WBC # BLD AUTO: 13.2 K/UL (ref 4.3–11.1)

## 2024-02-16 PROCEDURE — 84100 ASSAY OF PHOSPHORUS: CPT

## 2024-02-16 PROCEDURE — 36415 COLL VENOUS BLD VENIPUNCTURE: CPT

## 2024-02-16 PROCEDURE — 82962 GLUCOSE BLOOD TEST: CPT

## 2024-02-16 PROCEDURE — 85025 COMPLETE CBC W/AUTO DIFF WBC: CPT

## 2024-02-16 PROCEDURE — 6370000000 HC RX 637 (ALT 250 FOR IP): Performed by: STUDENT IN AN ORGANIZED HEALTH CARE EDUCATION/TRAINING PROGRAM

## 2024-02-16 PROCEDURE — 6370000000 HC RX 637 (ALT 250 FOR IP): Performed by: INTERNAL MEDICINE

## 2024-02-16 PROCEDURE — 6360000002 HC RX W HCPCS: Performed by: INTERNAL MEDICINE

## 2024-02-16 PROCEDURE — 2580000003 HC RX 258: Performed by: INTERNAL MEDICINE

## 2024-02-16 PROCEDURE — 80048 BASIC METABOLIC PNL TOTAL CA: CPT

## 2024-02-16 PROCEDURE — 83735 ASSAY OF MAGNESIUM: CPT

## 2024-02-16 PROCEDURE — 6370000000 HC RX 637 (ALT 250 FOR IP): Performed by: HOSPITALIST

## 2024-02-16 PROCEDURE — 2700000000 HC OXYGEN THERAPY PER DAY

## 2024-02-16 PROCEDURE — 2580000003 HC RX 258: Performed by: HOSPITALIST

## 2024-02-16 PROCEDURE — 2500000003 HC RX 250 WO HCPCS: Performed by: INTERNAL MEDICINE

## 2024-02-16 PROCEDURE — 1100000000 HC RM PRIVATE

## 2024-02-16 RX ORDER — INSULIN ASPART 100 [IU]/ML
25 INJECTION, SUSPENSION SUBCUTANEOUS 2 TIMES DAILY WITH MEALS
Status: DISCONTINUED | OUTPATIENT
Start: 2024-02-16 | End: 2024-02-16

## 2024-02-16 RX ORDER — INSULIN LISPRO 100 [IU]/ML
0-4 INJECTION, SOLUTION INTRAVENOUS; SUBCUTANEOUS
Status: DISCONTINUED | OUTPATIENT
Start: 2024-02-16 | End: 2024-02-17 | Stop reason: HOSPADM

## 2024-02-16 RX ORDER — INSULIN ASPART 100 [IU]/ML
INJECTION, SUSPENSION SUBCUTANEOUS
Refills: 3 | OUTPATIENT
Start: 2024-02-16

## 2024-02-16 RX ORDER — SODIUM CHLORIDE 9 MG/ML
INJECTION, SOLUTION INTRAVENOUS CONTINUOUS
Status: DISCONTINUED | OUTPATIENT
Start: 2024-02-16 | End: 2024-02-16 | Stop reason: ALTCHOICE

## 2024-02-16 RX ORDER — INSULIN LISPRO 100 [IU]/ML
7 INJECTION, SOLUTION INTRAVENOUS; SUBCUTANEOUS 2 TIMES DAILY WITH MEALS
Status: DISCONTINUED | OUTPATIENT
Start: 2024-02-16 | End: 2024-02-17 | Stop reason: HOSPADM

## 2024-02-16 RX ORDER — INSULIN LISPRO 100 [IU]/ML
0-4 INJECTION, SOLUTION INTRAVENOUS; SUBCUTANEOUS NIGHTLY
Status: DISCONTINUED | OUTPATIENT
Start: 2024-02-16 | End: 2024-02-17 | Stop reason: HOSPADM

## 2024-02-16 RX ORDER — INSULIN GLARGINE 100 [IU]/ML
10 INJECTION, SOLUTION SUBCUTANEOUS ONCE
Status: COMPLETED | OUTPATIENT
Start: 2024-02-16 | End: 2024-02-16

## 2024-02-16 RX ORDER — DEXTROSE MONOHYDRATE 100 MG/ML
INJECTION, SOLUTION INTRAVENOUS CONTINUOUS PRN
Status: DISCONTINUED | OUTPATIENT
Start: 2024-02-16 | End: 2024-02-17 | Stop reason: HOSPADM

## 2024-02-16 RX ADMIN — INSULIN HUMAN 17 UNITS: 100 INJECTION, SUSPENSION SUBCUTANEOUS at 09:13

## 2024-02-16 RX ADMIN — GABAPENTIN 900 MG: 300 CAPSULE ORAL at 08:55

## 2024-02-16 RX ADMIN — OXYCODONE AND ACETAMINOPHEN 1 TABLET: 10; 325 TABLET ORAL at 08:56

## 2024-02-16 RX ADMIN — SODIUM CHLORIDE: 9 INJECTION, SOLUTION INTRAVENOUS at 02:04

## 2024-02-16 RX ADMIN — OSELTAMIVIR PHOSPHATE 75 MG: 75 CAPSULE ORAL at 08:56

## 2024-02-16 RX ADMIN — INSULIN LISPRO 1 UNITS: 100 INJECTION, SOLUTION INTRAVENOUS; SUBCUTANEOUS at 09:14

## 2024-02-16 RX ADMIN — INSULIN LISPRO 2 UNITS: 100 INJECTION, SOLUTION INTRAVENOUS; SUBCUTANEOUS at 13:07

## 2024-02-16 RX ADMIN — OSELTAMIVIR PHOSPHATE 75 MG: 75 CAPSULE ORAL at 20:33

## 2024-02-16 RX ADMIN — METOPROLOL SUCCINATE 25 MG: 25 TABLET, EXTENDED RELEASE ORAL at 20:33

## 2024-02-16 RX ADMIN — OXYCODONE AND ACETAMINOPHEN 1 TABLET: 10; 325 TABLET ORAL at 20:33

## 2024-02-16 RX ADMIN — INSULIN GLARGINE 10 UNITS: 100 INJECTION, SOLUTION SUBCUTANEOUS at 02:05

## 2024-02-16 RX ADMIN — INSULIN LISPRO 7 UNITS: 100 INJECTION, SOLUTION INTRAVENOUS; SUBCUTANEOUS at 09:14

## 2024-02-16 RX ADMIN — SODIUM PHOSPHATE, MONOBASIC, MONOHYDRATE AND SODIUM PHOSPHATE, DIBASIC, ANHYDROUS 15 MMOL: 276; 142 INJECTION, SOLUTION INTRAVENOUS at 06:44

## 2024-02-16 RX ADMIN — INSULIN HUMAN 17 UNITS: 100 INJECTION, SUSPENSION SUBCUTANEOUS at 16:51

## 2024-02-16 RX ADMIN — INSULIN LISPRO 7 UNITS: 100 INJECTION, SOLUTION INTRAVENOUS; SUBCUTANEOUS at 17:34

## 2024-02-16 RX ADMIN — ENOXAPARIN SODIUM 40 MG: 100 INJECTION SUBCUTANEOUS at 17:35

## 2024-02-16 RX ADMIN — GABAPENTIN 900 MG: 300 CAPSULE ORAL at 20:33

## 2024-02-16 RX ADMIN — INSULIN LISPRO 2 UNITS: 100 INJECTION, SOLUTION INTRAVENOUS; SUBCUTANEOUS at 17:35

## 2024-02-16 ASSESSMENT — PAIN DESCRIPTION - LOCATION
LOCATION: GENERALIZED
LOCATION: GENERALIZED

## 2024-02-16 ASSESSMENT — PAIN DESCRIPTION - DESCRIPTORS
DESCRIPTORS: ACHING
DESCRIPTORS: ACHING

## 2024-02-16 ASSESSMENT — PAIN SCALES - GENERAL
PAINLEVEL_OUTOF10: 0
PAINLEVEL_OUTOF10: 7
PAINLEVEL_OUTOF10: 0
PAINLEVEL_OUTOF10: 7
PAINLEVEL_OUTOF10: 7

## 2024-02-16 ASSESSMENT — PAIN DESCRIPTION - ONSET: ONSET: GRADUAL

## 2024-02-16 ASSESSMENT — PAIN - FUNCTIONAL ASSESSMENT
PAIN_FUNCTIONAL_ASSESSMENT: ACTIVITIES ARE NOT PREVENTED
PAIN_FUNCTIONAL_ASSESSMENT: ACTIVITIES ARE NOT PREVENTED

## 2024-02-16 ASSESSMENT — PAIN DESCRIPTION - ORIENTATION: ORIENTATION: ANTERIOR

## 2024-02-16 ASSESSMENT — PAIN DESCRIPTION - FREQUENCY: FREQUENCY: INTERMITTENT

## 2024-02-16 NOTE — DIABETES MGMT
Patient admitted with DKA, type 2. Admit blood glucose 558.  HgbA1C 13 (eAG 326).  Blood glucose ranged 200-558 yesterday with patient receiving insulin drip.  Patient weaned from insulin drip with Lantus 10 units Blood glucose this morning was 238 and most recent 282. Creatinine 0.70. GFR > 60. Reviewed patient current regimen: Humalog correctional insulin, Humalog 7 units BID, and NPH 17 units BID.    Patient seen for assessment regarding diabetes management. Patient has a past medical history of neuropathy, DM, bilateral AKA. Patient states they have been living with diabetes for 10 years and voices positive family history of diabetes. Patient states they do not have a working glucometer with supplies at home. Patient states they are currently taking Novolog 70/30 insulin 35 units BID at home for management of diabetes. Patient states has been out of insulin for past 3 days.  Patient voices that they have experienced hypoglycemia in the past. Educated regarding hypoglycemia signs, symptoms, and treatment. Patient has not attended formal diabetes education in the past. Patient reports no difficulty with affording their diabetic supplies.      Patient given educational material, \"Diabetes Self-Management: A Patient Teaching Guide\", which was reviewed with patient. Explained basic physiology of diabetes, as well as causes, signs and symptoms, and treatments for hypoglycemia and hyperglycemia. Described the effects of poor glycemic control and the development of long-term complications such as renal, eye, nerve, and cardiovascular disease. Patient reports numbness and tingling in hands.  Per patient they typically drink water and Pepsi. Reviewed effects of sweetened beverages on glycemic control and discussed alternative beverages to help improve glycemic control. Discussed diet or Zero soda.  Per patient they typically eat one meal and snacks. Educated re: effects of carbohydrates on blood glucose, the \"plate  effect, and peak of insulin dose. Educated patient on the differences between prandial insulin and correctional insulin. Patient successful with practice insulin dose calculation using example blood glucose level and correctional insulin scale. Instructed patient to always seek guidance from their primary care provider about adjusting their insulin doses and not to adjust them on their own as this could negatively impact their glycemic control or result in hypoglycemia. Patient verbalizes understanding.    Patient would likely benefit from continued diabetes outpatient education. Patient provided with contact information to [facility] program to pursue at their convenience after discharge. Patient did not want referral to outpatient program.     Encouraged compliance with discharge regimen. Encouraged patient to continue to work on lifestyle modifications and to follow up with PCP for further titration of regimen. Patient does not currently have a PCP.  Case management following.  Patient verbalized understanding and voices no further questions regarding diabetes management.

## 2024-02-16 NOTE — PROGRESS NOTES
This was a visit to an influenza patient. I had prayer for the patient outside of the patient's room. I received updated information from the patient's nurse concerning this patient's needs. I will continue to follow-up with this patient and offer assistance and prayer.    Ezio Almanzar MDIV & KATHLEEN Webber

## 2024-02-16 NOTE — PROGRESS NOTES
TRANSFER - OUT REPORT:    Verbal report given to Debbi on Nikita Styles  being transferred to [facility] for routine progression of patient care       Report consisted of patient's Situation, Background, Assessment and   Recommendations(SBAR).     Information from the following report(s) Nurse Handoff Report, Adult Overview, Intake/Output, MAR, Recent Results, and Quality Measures was reviewed with the receiving nurse.           Lines:   Peripheral IV 02/15/24 Left Antecubital (Active)   Site Assessment Clean, dry & intact 02/16/24 1430   Line Status Flushed;Capped 02/16/24 1430   Line Care Connections checked and tightened 02/16/24 1430   Phlebitis Assessment No symptoms 02/16/24 1430   Infiltration Assessment 0 02/16/24 1430   Alcohol Cap Used Yes 02/16/24 1430   Dressing Status Clean, dry & intact 02/16/24 1430   Dressing Type Transparent 02/16/24 1430        Opportunity for questions and clarification was provided.      Patient transported with:  Monitor and Registered Nurse         Is This A New Presentation, Or A Follow-Up?: Skin Lesions How Severe Is Your Skin Lesion?: mild Have Your Skin Lesions Been Treated?: not been treated

## 2024-02-16 NOTE — PROGRESS NOTES
TRANSFER - IN REPORT:    Verbal report received from Malinda CORRALES on Nikita Styles  being received from ICU for routine progression of patient care      Report consisted of patient's Situation, Background, Assessment and   Recommendations(SBAR).     Information from the following report(s) Nurse Handoff Report was reviewed with the receiving nurse.    Opportunity for questions and clarification was provided.      Assessment completed upon patient's arrival to unit and care assumed.
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
## 2024-02-16 NOTE — ACP (ADVANCE CARE PLANNING)
Advance Care Planning     Advance Care Planning Activator (Inpatient)  Conversation Note      Date of ACP Conversation: 2/16/2024         ACP Activator: Pau Martin RN    {When Decision Maker makes decisions on behalf of the incapacitated patient: Decision Maker is asked to consider and make decisions based on patient values, known preferences, or best interests.     Health Care Decision Maker: No LW/HCPOA on file. Spouse is legal NOK unless document presented or completed stating otherwise.     Current Designated Health Care Decision Maker:     Primary Decision Maker: Gisele Styles - Spouse - [phone]  Click here to complete Healthcare Decision Makers including section of the Healthcare Decision Maker Relationship (ie \"Primary\")  Today we documented Decision Maker(s) consistent with Legal Next of Kin hierarchy.    Care Preferences    Full code per MD orders.

## 2024-02-17 VITALS
TEMPERATURE: 98.1 F | DIASTOLIC BLOOD PRESSURE: 98 MMHG | OXYGEN SATURATION: 95 % | RESPIRATION RATE: 16 BRPM | HEART RATE: 89 BPM | SYSTOLIC BLOOD PRESSURE: 136 MMHG | WEIGHT: 163.58 LBS | BODY MASS INDEX: 23.42 KG/M2 | HEIGHT: 70 IN

## 2024-02-17 PROBLEM — A41.9 SEPSIS (HCC): Status: RESOLVED | Noted: 2024-02-15 | Resolved: 2024-02-17

## 2024-02-17 LAB
ANION GAP SERPL CALC-SCNC: 6 MMOL/L (ref 2–11)
BASOPHILS # BLD: 0 K/UL (ref 0–0.2)
BASOPHILS NFR BLD: 0 % (ref 0–2)
BUN SERPL-MCNC: 19 MG/DL (ref 6–23)
CALCIUM SERPL-MCNC: 8.4 MG/DL (ref 8.3–10.4)
CHLORIDE SERPL-SCNC: 100 MMOL/L (ref 103–113)
CO2 SERPL-SCNC: 28 MMOL/L (ref 21–32)
CREAT SERPL-MCNC: 0.6 MG/DL (ref 0.8–1.5)
DIFFERENTIAL METHOD BLD: ABNORMAL
EOSINOPHIL # BLD: 0.5 K/UL (ref 0–0.8)
EOSINOPHIL NFR BLD: 5 % (ref 0.5–7.8)
ERYTHROCYTE [DISTWIDTH] IN BLOOD BY AUTOMATED COUNT: 14.2 % (ref 11.9–14.6)
GLUCOSE BLD STRIP.AUTO-MCNC: 223 MG/DL (ref 65–100)
GLUCOSE BLD STRIP.AUTO-MCNC: 258 MG/DL (ref 65–100)
GLUCOSE SERPL-MCNC: 245 MG/DL (ref 65–100)
HCT VFR BLD AUTO: 39 % (ref 41.1–50.3)
HGB BLD-MCNC: 12.7 G/DL (ref 13.6–17.2)
IMM GRANULOCYTES # BLD AUTO: 0.1 K/UL (ref 0–0.5)
IMM GRANULOCYTES NFR BLD AUTO: 1 % (ref 0–5)
LYMPHOCYTES # BLD: 3.7 K/UL (ref 0.5–4.6)
LYMPHOCYTES NFR BLD: 38 % (ref 13–44)
MAGNESIUM SERPL-MCNC: 2.1 MG/DL (ref 1.8–2.4)
MCH RBC QN AUTO: 28.9 PG (ref 26.1–32.9)
MCHC RBC AUTO-ENTMCNC: 32.6 G/DL (ref 31.4–35)
MCV RBC AUTO: 88.8 FL (ref 82–102)
MONOCYTES # BLD: 0.6 K/UL (ref 0.1–1.3)
MONOCYTES NFR BLD: 6 % (ref 4–12)
NEUTS SEG # BLD: 4.9 K/UL (ref 1.7–8.2)
NEUTS SEG NFR BLD: 50 % (ref 43–78)
NRBC # BLD: 0 K/UL (ref 0–0.2)
PHOSPHATE SERPL-MCNC: 2.1 MG/DL (ref 2.5–4.5)
PLATELET # BLD AUTO: 276 K/UL (ref 150–450)
PLATELET COMMENT: ADEQUATE
PMV BLD AUTO: 9.6 FL (ref 9.4–12.3)
POTASSIUM SERPL-SCNC: 3.6 MMOL/L (ref 3.5–5.1)
RBC # BLD AUTO: 4.39 M/UL (ref 4.23–5.6)
RBC MORPH BLD: ABNORMAL
SERVICE CMNT-IMP: ABNORMAL
SERVICE CMNT-IMP: ABNORMAL
SODIUM SERPL-SCNC: 134 MMOL/L (ref 136–146)
WBC # BLD AUTO: 9.8 K/UL (ref 4.3–11.1)
WBC MORPH BLD: ABNORMAL

## 2024-02-17 PROCEDURE — 84100 ASSAY OF PHOSPHORUS: CPT

## 2024-02-17 PROCEDURE — 83735 ASSAY OF MAGNESIUM: CPT

## 2024-02-17 PROCEDURE — 6370000000 HC RX 637 (ALT 250 FOR IP): Performed by: STUDENT IN AN ORGANIZED HEALTH CARE EDUCATION/TRAINING PROGRAM

## 2024-02-17 PROCEDURE — 82962 GLUCOSE BLOOD TEST: CPT

## 2024-02-17 PROCEDURE — 6370000000 HC RX 637 (ALT 250 FOR IP): Performed by: INTERNAL MEDICINE

## 2024-02-17 PROCEDURE — 85025 COMPLETE CBC W/AUTO DIFF WBC: CPT

## 2024-02-17 PROCEDURE — 80048 BASIC METABOLIC PNL TOTAL CA: CPT

## 2024-02-17 PROCEDURE — 36415 COLL VENOUS BLD VENIPUNCTURE: CPT

## 2024-02-17 RX ORDER — CLOPIDOGREL BISULFATE 75 MG/1
75 TABLET ORAL DAILY
Qty: 30 TABLET | Refills: 3 | Status: SHIPPED | OUTPATIENT
Start: 2024-02-17

## 2024-02-17 RX ORDER — OSELTAMIVIR PHOSPHATE 75 MG/1
75 CAPSULE ORAL 2 TIMES DAILY
Qty: 6 CAPSULE | Refills: 0 | Status: SHIPPED | OUTPATIENT
Start: 2024-02-17 | End: 2024-02-20

## 2024-02-17 RX ORDER — OXYCODONE AND ACETAMINOPHEN 10; 325 MG/1; MG/1
1 TABLET ORAL EVERY 8 HOURS PRN
Qty: 9 TABLET | Refills: 0 | Status: SHIPPED | OUTPATIENT
Start: 2024-02-17 | End: 2024-02-20

## 2024-02-17 RX ORDER — METOPROLOL SUCCINATE 25 MG/1
TABLET, EXTENDED RELEASE ORAL
Qty: 90 TABLET | Refills: 1 | Status: SHIPPED | OUTPATIENT
Start: 2024-02-17

## 2024-02-17 RX ORDER — GABAPENTIN 300 MG/1
900 CAPSULE ORAL 2 TIMES DAILY
Qty: 90 CAPSULE | Refills: 0 | Status: SHIPPED | OUTPATIENT
Start: 2024-02-17 | End: 2024-03-18

## 2024-02-17 RX ORDER — ALBUTEROL SULFATE 90 UG/1
2 AEROSOL, METERED RESPIRATORY (INHALATION) 4 TIMES DAILY PRN
Qty: 54 G | Refills: 1 | Status: SHIPPED | OUTPATIENT
Start: 2024-02-17 | End: 2024-05-28

## 2024-02-17 RX ADMIN — OSELTAMIVIR PHOSPHATE 75 MG: 75 CAPSULE ORAL at 09:15

## 2024-02-17 RX ADMIN — INSULIN LISPRO 2 UNITS: 100 INJECTION, SOLUTION INTRAVENOUS; SUBCUTANEOUS at 12:43

## 2024-02-17 RX ADMIN — GABAPENTIN 900 MG: 300 CAPSULE ORAL at 09:15

## 2024-02-17 RX ADMIN — OXYCODONE AND ACETAMINOPHEN 1 TABLET: 10; 325 TABLET ORAL at 09:16

## 2024-02-17 RX ADMIN — INSULIN HUMAN 17 UNITS: 100 INJECTION, SUSPENSION SUBCUTANEOUS at 06:31

## 2024-02-17 RX ADMIN — INSULIN LISPRO 1 UNITS: 100 INJECTION, SOLUTION INTRAVENOUS; SUBCUTANEOUS at 09:14

## 2024-02-17 RX ADMIN — OXYCODONE AND ACETAMINOPHEN 1 TABLET: 10; 325 TABLET ORAL at 03:38

## 2024-02-17 RX ADMIN — INSULIN LISPRO 7 UNITS: 100 INJECTION, SOLUTION INTRAVENOUS; SUBCUTANEOUS at 09:14

## 2024-02-17 ASSESSMENT — PAIN DESCRIPTION - ORIENTATION
ORIENTATION: LEFT;RIGHT
ORIENTATION: RIGHT;LEFT

## 2024-02-17 ASSESSMENT — PAIN DESCRIPTION - DESCRIPTORS
DESCRIPTORS: OTHER (COMMENT)
DESCRIPTORS: ACHING

## 2024-02-17 ASSESSMENT — PAIN DESCRIPTION - FREQUENCY: FREQUENCY: INTERMITTENT

## 2024-02-17 ASSESSMENT — PAIN DESCRIPTION - LOCATION
LOCATION: FOOT
LOCATION: KNEE

## 2024-02-17 ASSESSMENT — PAIN DESCRIPTION - PAIN TYPE: TYPE: ACUTE PAIN

## 2024-02-17 ASSESSMENT — PAIN SCALES - GENERAL
PAINLEVEL_OUTOF10: 7
PAINLEVEL_OUTOF10: 0
PAINLEVEL_OUTOF10: 6

## 2024-02-17 ASSESSMENT — PAIN - FUNCTIONAL ASSESSMENT: PAIN_FUNCTIONAL_ASSESSMENT: ACTIVITIES ARE NOT PREVENTED

## 2024-02-17 ASSESSMENT — PAIN DESCRIPTION - ONSET: ONSET: PROGRESSIVE

## 2024-02-17 NOTE — PLAN OF CARE
Problem: Discharge Planning  Goal: Discharge to home or other facility with appropriate resources  Outcome: Progressing  Flowsheets (Taken 2/16/2024 1930)  Discharge to home or other facility with appropriate resources: Identify barriers to discharge with patient and caregiver     Problem: Pain  Goal: Verbalizes/displays adequate comfort level or baseline comfort level  Outcome: Progressing     Problem: Safety - Adult  Goal: Free from fall injury  Outcome: Progressing  Flowsheets (Taken 2/16/2024 1930)  Free From Fall Injury: Instruct family/caregiver on patient safety     Problem: Skin/Tissue Integrity  Goal: Absence of new skin breakdown  Description: 1.  Monitor for areas of redness and/or skin breakdown  2.  Assess vascular access sites hourly  3.  Every 4-6 hours minimum:  Change oxygen saturation probe site  4.  Every 4-6 hours:  If on nasal continuous positive airway pressure, respiratory therapy assess nares and determine need for appliance change or resting period.  Outcome: Progressing     Problem: ABCDS Injury Assessment  Goal: Absence of physical injury  Outcome: Progressing  Flowsheets (Taken 2/16/2024 1930)  Absence of Physical Injury: Implement safety measures based on patient assessment     Problem: Chronic Conditions and Co-morbidities  Goal: Patient's chronic conditions and co-morbidity symptoms are monitored and maintained or improved  Outcome: Progressing  Flowsheets (Taken 2/16/2024 1930)  Care Plan - Patient's Chronic Conditions and Co-Morbidity Symptoms are Monitored and Maintained or Improved: Monitor and assess patient's chronic conditions and comorbid symptoms for stability, deterioration, or improvement

## 2024-02-17 NOTE — ICUWATCH
RRT Clinical Rounding Nurse Progress Report      SUBJECTIVE: Called to assess patient secondary to transfer from critical care.      Vitals:    02/16/24 1430 02/16/24 1500 02/16/24 1545 02/16/24 1933   BP: (!) 121/58 121/67 134/81 (!) 150/85   Pulse: 91 86 92 99   Resp: 16 22 20 18   Temp:   98.8 °F (37.1 °C) 98.8 °F (37.1 °C)   TempSrc:   Axillary Oral   SpO2: 92% 93% 95% 95%   Weight:       Height:            LAB DATA:    Recent Labs     02/15/24  1253 02/15/24  1725 02/15/24  2103 02/16/24  0045 02/16/24  0428   * 130* 132* 133* 133*   K 4.6 4.2 3.5 3.4* 3.9   CL 83* 94* 97* 98* 101*   CO2 21 19* 26 30 27   BUN 16 15 15 15 14   MG  --  2.3  --   --  2.2   PHOS  --  2.7  --   --  1.6*   GLOB 4.0  --   --   --   --    ALT 15  --   --   --   --         Recent Labs     02/15/24  1253 02/16/24  0428   WBC 15.7* 13.2*   HGB 15.9 13.6   HCT 46.3 40.6*    265        DETERIORATION INDEX SCORE: 32    ASSESSMENT:  On arrival to room, I found patient to be asleep in bed. Patient easily wakes to voice. Patient is oriented x4. Denies pain or SOB. Respirations even and unlabored on 2L NC. No needs or concerns expressed at this time.      PLAN:  VS, labs, and progress notes reviewed. No concern per primary RN. Will follow per RRT Clinical Rounding Program protocol. Primary RN to call with concerns.    Matteo Martin RN  [place]: [phone]

## 2024-02-17 NOTE — CARE COORDINATION
Pt is for discharge home today with family and no needs/supportive care orders recieved for CM at this time.  Pt has needed DME in the home and no new orders for supportive care referrals at this time.   02/17/24 7826   Service Assessment   Patient's Healthcare Decision Maker is: Legal Next of Kin   Social/Functional History   Lives With Spouse   Bathroom Equipment 3-in-1 commode   Home Equipment Wheelchair-manual   Services At/After Discharge   Transition of Care Consult (CM Consult) Discharge Planning   Services At/After Discharge None   [place] Resource Information Provided? No   Mode of Transport at Discharge Other (see comment)  (family)   Confirm Follow Up Transport Family   Condition of Participation: Discharge Planning   The Plan for Transition of Care is related to the following treatment goals: Pt will return home with family at his functional baseline.   The Patient and/Or Patient Representative agree with the Discharge Plan? Yes
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
## 2024-02-18 LAB
BACTERIA SPEC CULT: NORMAL
BACTERIA SPEC CULT: NORMAL
SERVICE CMNT-IMP: NORMAL
SERVICE CMNT-IMP: NORMAL

## 2024-03-18 PROBLEM — J10.1 INFLUENZA A: Status: RESOLVED | Noted: 2024-02-15 | Resolved: 2024-03-18

## 2024-05-29 ENCOUNTER — TELEPHONE (OUTPATIENT)
Dept: FAMILY MEDICINE CLINIC | Facility: CLINIC | Age: 54
End: 2024-05-29

## 2024-05-29 NOTE — TELEPHONE ENCOUNTER
----- Message from Natalia Maldonado sent at 5/29/2024  2:36 PM EDT -----  Regarding: [facility] Appointment Request  [facility] Appointment Request    Patient needs appointment for [facility] Appointment Type: New to Provider.    Reason for Appointment Request: Requested Provider unavailable    Patient wants to have an appointment to Manny Muse, DO patient preferred date and time is any date but as long as its afternoon.  --------------------------------------------------------------------------------------------------------------------------    Relationship to Patient: Spouse/Partner Wife StephaniGisele    Call Back Information: OK to leave message on voicemail  Preferred Call Back Number: Phone [phone]

## 2024-08-09 ENCOUNTER — TELEPHONE (OUTPATIENT)
Dept: FAMILY MEDICINE CLINIC | Facility: CLINIC | Age: 54
End: 2024-08-09

## 2024-08-09 NOTE — TELEPHONE ENCOUNTER
----- Message from Padmaja Seymour sent at 8/9/2024  1:22 PM EDT -----  Regarding: [facility] Appointment Request  [facility] Appointment Request    Patient needs appointment for [facility] Appointment Type: New Patient.    Patient Requested Dates(s): as soon as possible  Patient Requested Time: afternoon 3 or 4 PM  Provider Name: Any Doctor that is available    Reason for Appointment Request: New Patient - Available appointments did not meet patient need  The patient is running out of his medications and the patient is experiencing pain in his left hand.  --------------------------------------------------------------------------------------------------------------------------    Relationship to Patient: Spouse/Partner: Wife     Call Back Information: OK to leave message on voicemail  Preferred Call Back Number: [phone]